# Patient Record
Sex: FEMALE | Race: ASIAN | NOT HISPANIC OR LATINO | URBAN - METROPOLITAN AREA
[De-identification: names, ages, dates, MRNs, and addresses within clinical notes are randomized per-mention and may not be internally consistent; named-entity substitution may affect disease eponyms.]

---

## 2017-12-24 ENCOUNTER — EMERGENCY (EMERGENCY)
Facility: HOSPITAL | Age: 82
LOS: 1 days | Discharge: HOME | End: 2017-12-24
Attending: PERSONAL EMERGENCY RESPONSE ATTENDANT | Admitting: PERSONAL EMERGENCY RESPONSE ATTENDANT
Payer: MEDICARE

## 2017-12-24 VITALS
HEART RATE: 60 BPM | RESPIRATION RATE: 18 BRPM | OXYGEN SATURATION: 100 % | DIASTOLIC BLOOD PRESSURE: 60 MMHG | SYSTOLIC BLOOD PRESSURE: 144 MMHG

## 2017-12-24 VITALS
HEIGHT: 59 IN | DIASTOLIC BLOOD PRESSURE: 71 MMHG | WEIGHT: 89.95 LBS | RESPIRATION RATE: 18 BRPM | TEMPERATURE: 99 F | OXYGEN SATURATION: 98 % | SYSTOLIC BLOOD PRESSURE: 136 MMHG | HEART RATE: 68 BPM

## 2017-12-24 DIAGNOSIS — Z96.649 PRESENCE OF UNSPECIFIED ARTIFICIAL HIP JOINT: Chronic | ICD-10-CM

## 2017-12-24 PROCEDURE — 73090 X-RAY EXAM OF FOREARM: CPT

## 2017-12-24 PROCEDURE — 70450 CT HEAD/BRAIN W/O DYE: CPT

## 2017-12-24 PROCEDURE — 25605 CLTX DST RDL FX/EPHYS SEP W/: CPT | Mod: LT

## 2017-12-24 PROCEDURE — 72125 CT NECK SPINE W/O DYE: CPT | Mod: 26

## 2017-12-24 PROCEDURE — 72125 CT NECK SPINE W/O DYE: CPT

## 2017-12-24 PROCEDURE — 99285 EMERGENCY DEPT VISIT HI MDM: CPT | Mod: 25

## 2017-12-24 PROCEDURE — 73100 X-RAY EXAM OF WRIST: CPT | Mod: 26,59,LT

## 2017-12-24 PROCEDURE — 73110 X-RAY EXAM OF WRIST: CPT | Mod: 26,LT

## 2017-12-24 PROCEDURE — 99284 EMERGENCY DEPT VISIT MOD MDM: CPT

## 2017-12-24 PROCEDURE — 73100 X-RAY EXAM OF WRIST: CPT

## 2017-12-24 PROCEDURE — 71010: CPT | Mod: 26

## 2017-12-24 PROCEDURE — 70450 CT HEAD/BRAIN W/O DYE: CPT | Mod: 26

## 2017-12-24 PROCEDURE — 71045 X-RAY EXAM CHEST 1 VIEW: CPT

## 2017-12-24 PROCEDURE — 73090 X-RAY EXAM OF FOREARM: CPT | Mod: 26,LT

## 2017-12-24 PROCEDURE — 73110 X-RAY EXAM OF WRIST: CPT

## 2017-12-24 RX ORDER — ACETAMINOPHEN 500 MG
975 TABLET ORAL ONCE
Qty: 0 | Refills: 0 | Status: COMPLETED | OUTPATIENT
Start: 2017-12-24 | End: 2017-12-24

## 2017-12-24 RX ORDER — ACETAMINOPHEN 500 MG
1000 TABLET ORAL ONCE
Qty: 0 | Refills: 0 | Status: DISCONTINUED | OUTPATIENT
Start: 2017-12-24 | End: 2017-12-24

## 2017-12-24 RX ORDER — DIAZEPAM 5 MG
2.5 TABLET ORAL ONCE
Qty: 0 | Refills: 0 | Status: DISCONTINUED | OUTPATIENT
Start: 2017-12-24 | End: 2017-12-24

## 2017-12-24 RX ADMIN — Medication 975 MILLIGRAM(S): at 17:47

## 2017-12-24 RX ADMIN — Medication 2.5 MILLIGRAM(S): at 17:47

## 2017-12-24 NOTE — ED ADULT NURSE NOTE - OBJECTIVE STATEMENT
pt is an 89 yr F sent in by urgent care for L wrist fx in splint that was placed today at 2pm/ pt fell x 2 yesterday, once fell onto R hip and second time on to out stretched L hand. denies hitting head or LOC, as per family fall was unwitnessed. pt reports remembering fall. no midline cervical pain. pt able to move all extremities. cap refill < 3 secs on L hand, able to move fingers. no swelling or cyanosis noted.

## 2017-12-24 NOTE — ED PROVIDER NOTE - ATTENDING CONTRIBUTION TO CARE
Attending MD Navarro.  Agree with above.  Pt is an 89 yr old female with hx htn, hld, frequent falls 2/2 walking without her walker.  Pt had 2 falls yesterday, first of which was with her daughter when she refused assistance getting out of the car.  Pt fell onto R side.  Then walked into house and fell onto outstretched L wrist.  PT told she has a very sig ulnar fx and needs to come to ED by urgent care. Pt has equal pulses in all extremities, full distal movement of digits.  No LOC/head injury known.  2nd fall was unwitnessed however and CT head/neck ordered.  Pt has been ambulatory since both falls.  Pt seen at urgent care today prior to presentation.

## 2017-12-24 NOTE — CONSULT NOTE ADULT - SUBJECTIVE AND OBJECTIVE BOX
89y Female presents c/o L wrist pain sp mechanical fall. Denies Headstrike/LOC. Denies numbness, tingling paresthesias in affected extremity. Able to ambulate after fall. No other orthopedic injuries at this time    PAST MEDICAL & SURGICAL HISTORY:  Dementia  Hypertension  S/P total hip arthroplasty    MEDICATIONS  (STANDING):    Allergies    eggs (Other)  No Known Drug Allergies    Intolerances    Imaging: XR was personally reviewed and demonstates L distal radius fracture with loss of radial height and dorsal tilt, no intraarticular extension    Vital Signs Last 24 Hrs  T(C): 37.4 (12-24-17 @ 14:23), Max: 37.4 (12-24-17 @ 14:23)  T(F): 99.3 (12-24-17 @ 14:23), Max: 99.3 (12-24-17 @ 14:23)  HR: 60 (12-24-17 @ 18:41) (60 - 68)  BP: 144/60 (12-24-17 @ 18:41) (136/71 - 147/56)  BP(mean): --  RR: 18 (12-24-17 @ 18:41) (18 - 18)  SpO2: 100% (12-24-17 @ 18:41) (98% - 100%)    Gen: NAD  L UE: Skin intact, +gross deformity of the wrist, +ain/pin/m/r/u function, SILT C5-T1, radial pulse intact, compartments soft, brisk cap refill     Secondary Survey: Full ROM of unaffected extremities, SILT globally, compartments soft, no bony TTP over bony prominences, no calf TTP, able to SLR with bilateral LE, no TTP along axial spine    Procedure: 10cc 1% lidocaine hematoma block injected under sterile procedure into L wrist. Closed reduction and placement of a sugartong splint performed. Post procedure imaging demonstrates appropriately reduced distal radius. Post procedure exam demonstrated NV intact.    A/P: 89y Female w L distal radius fracture sp closed reduction and placement of sugartong splint  Pain control  NWB L UE in sling for comfort   keep splint clean and elisa  Ice, elevate extremity  Active movement of fingers encouraged  Signs and symptoms of compartment syndrome were discussed with the patient and advised to return to ED if suspected  Follow up with Dr. Treviño within 1 week, call office for appointment  Ortho stable for DC

## 2017-12-24 NOTE — ED ADULT NURSE NOTE - CAS DISCH ACCOMP BY
Family/Self/pt able to get into wheelchair from home. family reports 3 small steps to home and will be able to take care of pt

## 2017-12-24 NOTE — ED ADULT NURSE NOTE - CHPI ED SYMPTOMS NEG
no tingling/no abrasion/no weakness/no back pain/no fever/no numbness/no difficulty bearing weight/no bruising

## 2017-12-24 NOTE — ED ADULT NURSE REASSESSMENT NOTE - NS ED NURSE REASSESS COMMENT FT1
unable to gain IV access x 2, MD notified. as per MD will hold off on blood and IV until further notice

## 2017-12-24 NOTE — ED PROVIDER NOTE - CARE PLAN
Principal Discharge DX:	Distal radius fracture, left  Instructions for follow-up, activity and diet:	Follow up with your Primary Care Physician within the next 2-3 days  Bring a copy of your test results with you to your appointment  Continue your current medication regimen  Return to the Emergency Room if you experience new or worsening symptoms  keep splint in place  Take Motrin 400-600mg every 6 hrs as needed for pain. Take with food   Take Tylenol 650mg every 6 hrs as needed for pain.   Follow up with orthopedics Dr Treviño [phone]  Rest. Apply cold pack for 20 minutes multiple times daily. Elevate. Principal Discharge DX:	Distal radius fracture, left  Goal:	Distal radius/ulna fx L  Instructions for follow-up, activity and diet:	Follow up with your Primary Care Physician within the next 2-3 days  Bring a copy of your test results with you to your appointment  Continue your current medication regimen  Return to the Emergency Room if you experience new or worsening symptoms  keep splint in place  Take Motrin 400-600mg every 6 hrs as needed for pain. Take with food   Take Tylenol 650mg every 6 hrs as needed for pain.   Follow up with orthopedics Dr Treviño [phone]  Rest. Apply cold pack for 20 minutes multiple times daily. Elevate.

## 2017-12-24 NOTE — ED PROVIDER NOTE - PROGRESS NOTE DETAILS
[employer]:  Pt has distal radius/ulna fx with ulnar dislocation. Orthopedics consulted and reports that they will see pt. [employer]:  Pt has distal radius/ulna fx with ulnar dislocation. Orthopedics consulted and reports that they will see pt.  Pt stable at time of signout to incoming team pending ortho reduction/splinting.  Remains neurovascularly intact distal to site of ulna/radius fx.

## 2017-12-24 NOTE — ED PROVIDER NOTE - OBJECTIVE STATEMENT
88 yo F w/ PMH of dementia, HTN, frequent falls, recent admission in Jan 2016 for hip hemiarthroplasty s/p fall, presents with 2 falls yesterday. first getting out of car was pushing daughter out of the way because she didn't want help walking. fell onto the right side of her body, got herself up without difficulty. when she got into the house she fell again because she was walking without her walker. this time FOOSH to left. unwitnessed and required help walking. ambulatory since. patient sent to  today and was sent here for forearm fx with "possible nerve damage." patient unsure if she hit her head. per family she is at her baseline Mental status

## 2018-01-05 ENCOUNTER — APPOINTMENT (OUTPATIENT)
Dept: ORTHOPEDIC SURGERY | Facility: CLINIC | Age: 83
End: 2018-01-05
Payer: MEDICARE

## 2018-01-05 VITALS — HEIGHT: 59 IN | BODY MASS INDEX: 18.14 KG/M2 | WEIGHT: 90 LBS

## 2018-01-05 PROCEDURE — 25650 CLTX ULNAR STYLOID FRACTURE: CPT | Mod: LT

## 2018-01-05 PROCEDURE — 99214 OFFICE O/P EST MOD 30 MIN: CPT | Mod: 57

## 2018-01-05 PROCEDURE — 73110 X-RAY EXAM OF WRIST: CPT | Mod: LT

## 2018-01-05 PROCEDURE — 25500 CLTX RDL SHFT FX W/O MNPJ: CPT | Mod: LT

## 2018-01-05 RX ORDER — HYDROXYZINE HYDROCHLORIDE 25 MG/1
25 TABLET ORAL
Qty: 90 | Refills: 0 | Status: ACTIVE | COMMUNITY
Start: 2016-08-19

## 2018-01-05 RX ORDER — AMLODIPINE BESYLATE 2.5 MG/1
2.5 TABLET ORAL
Qty: 90 | Refills: 0 | Status: ACTIVE | COMMUNITY
Start: 2017-10-23

## 2018-02-19 NOTE — ED PROVIDER NOTE - PLAN OF CARE
Follow up with your Primary Care Physician within the next 2-3 days  Bring a copy of your test results with you to your appointment  Continue your current medication regimen  Return to the Emergency Room if you experience new or worsening symptoms  keep splint in place  Take Motrin 400-600mg every 6 hrs as needed for pain. Take with food   Take Tylenol 650mg every 6 hrs as needed for pain.   Follow up with orthopedics Dr Treviño [phone]  Rest. Apply cold pack for 20 minutes multiple times daily. Elevate. 0 Distal radius/ulna fx L

## 2018-02-21 ENCOUNTER — APPOINTMENT (OUTPATIENT)
Dept: ORTHOPEDIC SURGERY | Facility: CLINIC | Age: 83
End: 2018-02-21
Payer: MEDICARE

## 2018-02-21 DIAGNOSIS — S52.552D OTHER EXTRAARTICULAR FRACTURE OF LOWER END OF LEFT RADIUS, SUBSEQUENT ENCOUNTER FOR CLOSED FRACTURE WITH ROUTINE HEALING: ICD-10-CM

## 2018-02-21 DIAGNOSIS — S52.615A NONDISPLACED FRACTURE OF LEFT ULNA STYLOID PROCESS, INITIAL ENCOUNTER FOR CLOSED FRACTURE: ICD-10-CM

## 2018-02-21 DIAGNOSIS — S52.615D NONDISPLACED FRACTURE OF LEFT ULNA STYLOID PROCESS, SUBSEQUENT ENCOUNTER FOR CLOSED FRACTURE WITH ROUTINE HEALING: ICD-10-CM

## 2018-02-21 DIAGNOSIS — S52.572A OTHER INTRAARTICULAR FRACTURE OF LOWER END OF LEFT RADIUS, INITIAL ENCOUNTER FOR CLOSED FRACTURE: ICD-10-CM

## 2018-02-21 PROCEDURE — 99024 POSTOP FOLLOW-UP VISIT: CPT

## 2018-02-21 PROCEDURE — 73110 X-RAY EXAM OF WRIST: CPT | Mod: LT

## 2018-07-24 ENCOUNTER — OUTPATIENT (OUTPATIENT)
Dept: OUTPATIENT SERVICES | Facility: HOSPITAL | Age: 83
LOS: 1 days | Discharge: HOME | End: 2018-07-24

## 2018-07-24 DIAGNOSIS — Z96.649 PRESENCE OF UNSPECIFIED ARTIFICIAL HIP JOINT: Chronic | ICD-10-CM

## 2018-07-24 DIAGNOSIS — D69.3 IMMUNE THROMBOCYTOPENIC PURPURA: ICD-10-CM

## 2018-08-01 ENCOUNTER — APPOINTMENT (OUTPATIENT)
Dept: HEMATOLOGY ONCOLOGY | Facility: CLINIC | Age: 83
End: 2018-08-01

## 2018-08-26 ENCOUNTER — EMERGENCY (EMERGENCY)
Facility: HOSPITAL | Age: 83
LOS: 1 days | Discharge: HOME | End: 2018-08-26
Attending: EMERGENCY MEDICINE
Payer: MEDICARE

## 2018-08-26 VITALS
RESPIRATION RATE: 18 BRPM | TEMPERATURE: 99 F | OXYGEN SATURATION: 95 % | HEIGHT: 60 IN | SYSTOLIC BLOOD PRESSURE: 152 MMHG | WEIGHT: 89.95 LBS | HEART RATE: 75 BPM | DIASTOLIC BLOOD PRESSURE: 76 MMHG

## 2018-08-26 VITALS
SYSTOLIC BLOOD PRESSURE: 160 MMHG | HEART RATE: 72 BPM | RESPIRATION RATE: 20 BRPM | TEMPERATURE: 98 F | DIASTOLIC BLOOD PRESSURE: 77 MMHG | OXYGEN SATURATION: 99 %

## 2018-08-26 DIAGNOSIS — Z96.649 PRESENCE OF UNSPECIFIED ARTIFICIAL HIP JOINT: Chronic | ICD-10-CM

## 2018-08-26 PROCEDURE — 99283 EMERGENCY DEPT VISIT LOW MDM: CPT

## 2018-08-26 PROCEDURE — 73630 X-RAY EXAM OF FOOT: CPT

## 2018-08-26 PROCEDURE — 73630 X-RAY EXAM OF FOOT: CPT | Mod: 26,LT

## 2018-08-26 RX ORDER — ACETAMINOPHEN 500 MG
975 TABLET ORAL ONCE
Qty: 0 | Refills: 0 | Status: COMPLETED | OUTPATIENT
Start: 2018-08-26 | End: 2018-08-26

## 2018-08-26 RX ADMIN — Medication 975 MILLIGRAM(S): at 19:22

## 2018-08-26 NOTE — ED PROVIDER NOTE - MUSCULOSKELETAL, MLM
left knee and ankle non tender full ROM.  Left foot with some tenderness over the ventral aspect, toes full ROM

## 2018-08-26 NOTE — ED ADULT NURSE NOTE - OBJECTIVE STATEMENT
86 yo F PMH of dementia, HTN, frequent falls presenting to ED from home accompanied by her daughter c/o left foot pain x the passed few dsyas and now worse today. daughter states that pt did not fall or injure herself recently that she was made aware of, states that pt does have hx of frequent falls but did not start c/o the left foot pain and difficulty walking and pain upon ambulation until today. left foot ecchymosis noted upon assessment with tenderness upon palpation, no swelling, pulses present with equal warmth and sensation. VS stable. pending xray.

## 2018-08-26 NOTE — ED PROVIDER NOTE - MEDICAL DECISION MAKING DETAILS
ATTG: : foot pain with recent fall, concern for fracture. check xray, pain medication, re eval for dispo.

## 2018-08-26 NOTE — ED PROVIDER NOTE - OBJECTIVE STATEMENT
88 yo F w/ PMH of dementia, HTN, frequent falls coming in with left foot pain that was worse today.  Pt's daughter believes she injured it during another fall earlier this week but initially was not that painful and she was not complaints.  Today had another fall into a chair (landed on her bottom) that did not cause any lower back or buttock pain however since has been complaining of worsening left foot pain and now is having difficulty putting weight on it.  No other complaints at this time.  no neck or back pain.  Did not hit head no LOC.  Nothing makes it better, ambulation makes it worse.

## 2018-08-26 NOTE — ED PROVIDER NOTE - ATTENDING CONTRIBUTION TO CARE
86 yo F w/ PMH of dementia, HTN, frequent falls presents with daughter for concern of pain on left foot. Patient and her daughter note she fell on Thursday, without complaints at that time. Today started having pain increasing and unable to walk or bear weight secondary to pain. also fell onto her buttocks getting into a chair. denies repeat trauma to her foot. no weakness no numbness. no burning pain.  No other complaints at this time.  no neck or back pain.  Did not hit head no LOC. did not take any medications for the pain.   Gen.  elderly woman, appears stated age.   HEENT:  nc/at  Lungs:  b/l bs  back: no ecchymosis no deformity. no midline tender. + scoliotic spine.   CVS: S1S2   Abd;  soft non tender  Ext: left foot ecchymosis and edema non pitting on left foot. full range of motion of foot / ankle and toes. min tend over 3rd toe. skin warm to touch. pulse 2+ b/l. no erythema.   Neuro: aaox 3 no focal deficits  MSK: moving all ext, able to sit up in chair and ambulate with some pain in foot.

## 2018-08-26 NOTE — ED ADULT NURSE NOTE - NSIMPLEMENTINTERV_GEN_ALL_ED
Implemented All Fall with Harm Risk Interventions:  [place] to call system. Call bell, personal items and telephone within reach. Instruct patient to call for assistance. Room bathroom lighting operational. Non-slip footwear when patient is off stretcher. Physically safe environment: no spills, clutter or unnecessary equipment. Stretcher in lowest position, wheels locked, appropriate side rails in place. Provide visual cue, wrist band, yellow gown, etc. Monitor gait and stability. Monitor for mental status changes and reorient to person, place, and time. Review medications for side effects contributing to fall risk. Reinforce activity limits and safety measures with patient and family. Provide visual clues: red socks.

## 2018-08-27 NOTE — ED POST DISCHARGE NOTE - RESULT SUMMARY
xray foot result showing: is linear lucencies at the base of the second digit, age indeterminate fracture.

## 2018-08-27 NOTE — ED POST DISCHARGE NOTE - DETAILS
unable to leave message. patient placed in hard-soled shoe for d/c. attempt to call patient to advise on this result and ensure follow-up. -Merlyn Minor PA-C advised the patients emergency contact samira of the discrepency and need to stay in boot until follow up with podiatry. demonstrates understanding - Charmaine Macias PA-C

## 2020-01-01 ENCOUNTER — EMERGENCY (EMERGENCY)
Facility: HOSPITAL | Age: 85
LOS: 1 days | End: 2020-01-01
Attending: EMERGENCY MEDICINE | Admitting: EMERGENCY MEDICINE
Payer: MEDICARE

## 2020-01-01 VITALS — RESPIRATION RATE: 16 BRPM | HEART RATE: 59 BPM | OXYGEN SATURATION: 62 %

## 2020-01-01 VITALS
TEMPERATURE: 98 F | RESPIRATION RATE: 16 BRPM | DIASTOLIC BLOOD PRESSURE: 97 MMHG | HEART RATE: 104 BPM | OXYGEN SATURATION: 97 % | SYSTOLIC BLOOD PRESSURE: 189 MMHG

## 2020-01-01 DIAGNOSIS — Z96.649 PRESENCE OF UNSPECIFIED ARTIFICIAL HIP JOINT: Chronic | ICD-10-CM

## 2020-01-01 LAB
ALBUMIN SERPL ELPH-MCNC: 4.1 G/DL — SIGNIFICANT CHANGE UP (ref 3.3–5)
ALP SERPL-CCNC: 121 U/L — HIGH (ref 40–120)
ALT FLD-CCNC: 11 U/L — SIGNIFICANT CHANGE UP (ref 4–33)
ANION GAP SERPL CALC-SCNC: 17 MMO/L — HIGH (ref 7–14)
AST SERPL-CCNC: 40 U/L — HIGH (ref 4–32)
BILIRUB SERPL-MCNC: 0.6 MG/DL — SIGNIFICANT CHANGE UP (ref 0.2–1.2)
BUN SERPL-MCNC: 22 MG/DL — SIGNIFICANT CHANGE UP (ref 7–23)
CALCIUM SERPL-MCNC: 9.4 MG/DL — SIGNIFICANT CHANGE UP (ref 8.4–10.5)
CHLORIDE SERPL-SCNC: 100 MMOL/L — SIGNIFICANT CHANGE UP (ref 98–107)
CO2 SERPL-SCNC: 16 MMOL/L — LOW (ref 22–31)
CREAT SERPL-MCNC: 0.73 MG/DL — SIGNIFICANT CHANGE UP (ref 0.5–1.3)
GLUCOSE SERPL-MCNC: 303 MG/DL — HIGH (ref 70–99)
HCT VFR BLD CALC: 38.3 % — SIGNIFICANT CHANGE UP (ref 34.5–45)
HGB BLD-MCNC: 12.4 G/DL — SIGNIFICANT CHANGE UP (ref 11.5–15.5)
INR BLD: 1.05 — SIGNIFICANT CHANGE UP (ref 0.88–1.17)
MAGNESIUM SERPL-MCNC: 2.1 MG/DL — SIGNIFICANT CHANGE UP (ref 1.6–2.6)
MCHC RBC-ENTMCNC: 31 PG — SIGNIFICANT CHANGE UP (ref 27–34)
MCHC RBC-ENTMCNC: 32.4 % — SIGNIFICANT CHANGE UP (ref 32–36)
MCV RBC AUTO: 95.8 FL — SIGNIFICANT CHANGE UP (ref 80–100)
NRBC # FLD: 0 K/UL — SIGNIFICANT CHANGE UP (ref 0–0)
PHOSPHATE SERPL-MCNC: 3.8 MG/DL — SIGNIFICANT CHANGE UP (ref 2.5–4.5)
PLATELET # BLD AUTO: 252 K/UL — SIGNIFICANT CHANGE UP (ref 150–400)
PMV BLD: 9.4 FL — SIGNIFICANT CHANGE UP (ref 7–13)
POTASSIUM SERPL-MCNC: 4.2 MMOL/L — SIGNIFICANT CHANGE UP (ref 3.5–5.3)
POTASSIUM SERPL-SCNC: 4.2 MMOL/L — SIGNIFICANT CHANGE UP (ref 3.5–5.3)
PROT SERPL-MCNC: 7.4 G/DL — SIGNIFICANT CHANGE UP (ref 6–8.3)
PROTHROM AB SERPL-ACNC: 11.7 SEC — SIGNIFICANT CHANGE UP (ref 9.8–13.1)
RBC # BLD: 4 M/UL — SIGNIFICANT CHANGE UP (ref 3.8–5.2)
RBC # FLD: 11.7 % — SIGNIFICANT CHANGE UP (ref 10.3–14.5)
SODIUM SERPL-SCNC: 133 MMOL/L — LOW (ref 135–145)
TROPONIN T, HIGH SENSITIVITY: 12 NG/L — SIGNIFICANT CHANGE UP (ref ?–14)
WBC # BLD: 17.2 K/UL — HIGH (ref 3.8–10.5)
WBC # FLD AUTO: 17.2 K/UL — HIGH (ref 3.8–10.5)

## 2020-01-01 PROCEDURE — 99285 EMERGENCY DEPT VISIT HI MDM: CPT

## 2020-01-01 PROCEDURE — 71045 X-RAY EXAM CHEST 1 VIEW: CPT | Mod: 26

## 2020-01-01 PROCEDURE — 70450 CT HEAD/BRAIN W/O DYE: CPT | Mod: 26

## 2020-01-01 PROCEDURE — 99292 CRITICAL CARE ADDL 30 MIN: CPT | Mod: 25

## 2020-01-01 PROCEDURE — 99291 CRITICAL CARE FIRST HOUR: CPT | Mod: 25

## 2020-01-01 PROCEDURE — 31500 INSERT EMERGENCY AIRWAY: CPT

## 2020-01-01 RX ORDER — CHLORHEXIDINE GLUCONATE 213 G/1000ML
15 SOLUTION TOPICAL EVERY 12 HOURS
Refills: 0 | Status: DISCONTINUED | OUTPATIENT
Start: 2020-01-01 | End: 2020-01-05

## 2020-01-01 RX ORDER — ROCURONIUM BROMIDE 10 MG/ML
60 VIAL (ML) INTRAVENOUS ONCE
Refills: 0 | Status: COMPLETED | OUTPATIENT
Start: 2020-01-01 | End: 2020-01-01

## 2020-01-01 RX ORDER — ETOMIDATE 2 MG/ML
20 INJECTION INTRAVENOUS ONCE
Refills: 0 | Status: COMPLETED | OUTPATIENT
Start: 2020-01-01 | End: 2020-01-01

## 2020-01-01 RX ORDER — MORPHINE SULFATE 50 MG/1
4 CAPSULE, EXTENDED RELEASE ORAL ONCE
Refills: 0 | Status: DISCONTINUED | OUTPATIENT
Start: 2020-01-01 | End: 2020-01-01

## 2020-01-01 RX ADMIN — Medication 60 MILLIGRAM(S): at 20:22

## 2020-01-01 RX ADMIN — ETOMIDATE 20 MILLIGRAM(S): 2 INJECTION INTRAVENOUS at 20:21

## 2020-01-01 RX ADMIN — MORPHINE SULFATE 4 MILLIGRAM(S): 50 CAPSULE, EXTENDED RELEASE ORAL at 22:22

## 2020-01-01 RX ADMIN — MORPHINE SULFATE 4 MILLIGRAM(S): 50 CAPSULE, EXTENDED RELEASE ORAL at 22:35

## 2020-01-01 NOTE — ED PROVIDER NOTE - CRITICAL CARE PROVIDED
telephone consultation with the patient's family/conducted a detailed discussion of DNR status/interpretation of diagnostic studies/consultation with other physicians

## 2020-01-01 NOTE — CONSULT NOTE ADULT - ASSESSMENT
89yo w SAH, SDH, IPH with herniation, made DNR by family     PLAN:   - no neurosurgical intervention

## 2020-01-01 NOTE — ED ADULT TRIAGE NOTE - CHIEF COMPLAINT QUOTE
Pt. had unwitnessed slumped fall to left side around 6:10 after eating meal. Pt. normally speaks but not responding at this time Pt. moaning and gurgling in triage-suctioned on the way over. No facial droop noted. Pmhx: Dementia, alzheimers, HTN. MD called for eval -no stroke code called

## 2020-01-01 NOTE — ED PROVIDER NOTE - CLINICAL SUMMARY MEDICAL DECISION MAKING FREE TEXT BOX
89F with hx of HTN, ITP (not on prednisone, last platelet wnl last year) presenting with acute onset obtundation since 6 pm today. Concerning for ICH bleed. Plan -  basics, CTH.

## 2020-01-01 NOTE — PROVIDER CONTACT NOTE (OTHER) - ASSESSMENT
Per request of provider, SW met with patient's daughter Tierney in family room. Tierney reports she is reaching out to family and she is not in need of assistance at this time. SW to remain available if needed.

## 2020-01-01 NOTE — ED PROVIDER NOTE - PHYSICAL EXAMINATION
GENERAL: no acute distress, non-toxic appearing  HEAD: normocephalic, atraumatic  HEENT: normal conjunctiva, oral mucosa moist, neck supple  CARDIAC: regular rate and rhythm, normal S1 and S2,  no appreciable murmurs  PULM: coarse lung sounds bilaterally  GI: abdomen nondistended, soft, nontender, no guarding or rebound tenderness  NEURO: pupils 4 mm bilaterally, non-reactive to light; decorticate posturing; GCS < 6  MSK: no visible deformities, no peripheral edema, calf tenderness/redness/swelling  SKIN: no visible rashes, dry, well-perfused

## 2020-01-01 NOTE — ED ADULT NURSE NOTE - OBJECTIVE STATEMENT
Pt received to [facility] unresponsive with shallow breathing and posturing. Pt has dried dark green vomit noted around the mouth. MD at bedside for intubation

## 2020-01-01 NOTE — ED PROCEDURE NOTE - ATTENDING CONTRIBUTION TO CARE
Gonflor: I have seen and examined the patient face to face.  I was present during the above procedure and  have reviewed and addended the procedure note.

## 2020-01-01 NOTE — ED PROVIDER NOTE - PROGRESS NOTE DETAILS
Destin: patient's son, a physician, called daughter who is at bedside stating he wants patient to be made DNR. Patient s/p intubation for AMS. CT showing catastrophic bleed. NSGY called, but likely no intervention will be offered. Destin: LILY consulted. Resident: Ivan West - Called by triage RN for code stroke eval. Upon arrival to pts bedside, minimally responsive, no reaction to pain, non-verbal with vomitus on night gown. Advised that pt needs immediate eval in room likely needs intubation prior to CT scan, RN advised pt will be moved to room urgently, spoke with red team regarding pt. Gong: Patient's family noted to want to terminally extubate.  Patient has extensive bleed.  Offered support services, family's  at bedside.  Answered all questions and addressed all concerns. Will give morphine for any potential comfort. Tong: patient pronounced at 12:01 am 12/20. Family updated, all questions and concerns addressed. Destin: spoke with Reny, Investigator for ME office regarding patient's case. Rejected as ME case. Case number [id number].

## 2020-01-01 NOTE — ED PROVIDER NOTE - CRITICAL CARE INDICATION, MLM
patient was critically ill... Patient was critically ill with a high probability of imminent or life threatening deterioration.    89 yo F HTN ITP a/w ams concern for acute ICH.  Concern for thrombocytopenia.  Intubated for airway protection and stat ct ordered.  Will require neurosurg and ICU consult.  Extensive goals of care discussion occurred with daughter at bedside.  Discussed findings of exam and ct scan with son-in law ED physician who is in [place]. Patient was critically ill with a high probability of imminent or life threatening deterioration.

## 2020-01-01 NOTE — ED PROVIDER NOTE - OBJECTIVE STATEMENT
89F with hx of HTN, 89F with hx of HTN, ITP (not on prednisone, last platelet wnl last year) presenting with acute onset obtundation since 6 pm today. Yesterday, was at baseline state of health, eating, talking, walking normally. Today, daughter helped her shower around 6 pm. Afterwards, patient was found to be obtunded. En route to hospital, patient vomited twice. In ER, patient presenting with decorticate posturing, obtundation.

## 2020-01-01 NOTE — ED ADULT NURSE NOTE - NSIMPLEMENTINTERV_GEN_ALL_ED
Implemented All Fall with Harm Risk Interventions:  [place] to call system. Call bell, personal items and telephone within reach. Instruct patient to call for assistance. Room bathroom lighting operational. Non-slip footwear when patient is off stretcher. Physically safe environment: no spills, clutter or unnecessary equipment. Stretcher in lowest position, wheels locked, appropriate side rails in place. Provide visual cue, wrist band, yellow gown, etc. Monitor gait and stability. Monitor for mental status changes and reorient to person, place, and time. Review medications for side effects contributing to fall risk. Reinforce activity limits and safety measures with patient and family. Provide visual clues: red socks.

## 2020-01-01 NOTE — ED ADULT NURSE NOTE - FINAL NURSING ELECTRONIC SIGNATURE
M-Plasty Intermediate Repair Preamble Text (Leave Blank If You Do Not Want): Undermining was performed with blunt dissection. 02-Jan-2020 01:49

## 2020-01-01 NOTE — CONSULT NOTE ADULT - SUBJECTIVE AND OBJECTIVE BOX
NEUROSURGERY CONSULT  CORAL LORENZO   01-01-20 @ 21:48    HPI: 89y Female PMHx ITP, HTN presents after being found slumped on toilet by daughter who she lives with. En route to hospital, patient vomited twice, was obtunded upon arrival per ED, intubated in ED. Pt found to have acute SAH, acute IPH, acute on chronic SDH with herniation.     RADIOLOGY:   < from: CT Head No Cont (01.01.20 @ 19:51) >  There is evidence of a large acute on chronic subdural hematoma seen involving the right temporal frontal and parietal region. This subdural hematoma measures approximately 2.1 cm in widest diameter. Small acute subdural hematoma is seen along the bilateral tentorial region.    Acute subarachnoid hemorrhage is seen involving the basal cistern region.    Large acute parenchymal hemorrhage is seen involving the right basal ganglia region with surrounding edema. This area of parenchymal hemorrhage measures approximately 5.8 x 3.9 cm.    There is mass effect seen on the right lateral ventricle with right to left (2.9 cm) shift. Subfalcine and uncal herniation is identified. Possible early transtentorial herniation is seen.    Abnormal low-attenuation is seen involving the bifrontal region which is suspicious for acute bilateral anterior cerebral artery infarcts. Acute right MCA infarct is also suspected.    Evaluation of the osseous structures with the appropriate window appears unremarkable.    Left maxillary sinus mucosal thickening is seen.    Patient is status post bilateral scleral banding.    Impression: Acute on chronic subdural hematoma with subarachnoid and parenchymal hemorrhage as described above.    Right to left shift as well as subfalcine, uncal and transtentorial herniations.    Acute infarct suspected as described above.    MEDS:  chlorhexidine 0.12% Liquid 15 milliLiter(s) Oral Mucosa every 12 hours    PHYSICAL EXAM:  Vital Signs Last 24 Hrs  T(C): 36.4 (01 Jan 2020 20:20), Max: 36.4 (01 Jan 2020 19:01)  T(F): 97.6 (01 Jan 2020 20:20), Max: 97.6 (01 Jan 2020 20:20)  HR: 107 (01 Jan 2020 21:30) (97 - 107)  BP: 156/90 (01 Jan 2020 21:30) (150/91 - 189/97)  BP(mean): 106 (01 Jan 2020 21:30) (106 - 106)  RR: 16 (01 Jan 2020 21:30) (16 - 16)  SpO2: 100% (01 Jan 2020 21:30) (97% - 100%)    intubated, not sedated   pupils 3mm not reactive   no gag   no corneals   no response to noxious     LABS:                        12.4   17.20 )-----------( 252      ( 01 Jan 2020 19:40 )             38.3     01-01    133<L>  |  100  |  22  ----------------------------<  303<H>  4.2   |  16<L>  |  0.73    Ca    9.4      01 Jan 2020 19:40  Phos  3.8     01-01  Mg     2.1     01-01    TPro  7.4  /  Alb  4.1  /  TBili  0.6  /  DBili  x   /  AST  40<H>  /  ALT  11  /  AlkPhos  121<H>  01-01  PT/INR - ( 01 Jan 2020 19:40 )   PT: 11.7 SEC;   INR: 1.05

## 2020-01-02 NOTE — ED ADULT NURSE REASSESSMENT NOTE - CONDITION
Pt noted to be in asystole on cardiac monitor with absences of pulse and spontaneous breathing. ED MD pronounced time of death at 0001. Family at bedside.

## 2020-01-02 NOTE — DISCHARGE NOTE FOR THE EXPIRED PATIENT - HOSPITAL COURSE
89F with hx of HTN, ITP presenting with obtundation since 6 pm. Patient unresponsive in the ED. Had 2 episodes of emesis en route to the hospital. Patient accompanied by daughter, who stated patient was at her regular state of health up until today. No trauma or falls per the daughter. Patient had GCS < 6 upon presentation, pupils 4 mm bilaterally non-reactive with decorticate posturing and was subsequently intubated for airway protection. CTH showed acute on chronic subdural hematoma with subarachnoid and parenchymal hemorrhage with right to left shift and subfalcine, uncal and transtentorial herniations. Patient made DNR by family. Patient pronounced at 12:01 am 1/2/20 via cardiopulmonary criteria. No lung sounds upon ascultation, no pulse palpable, no heart sounds, asystole on the cardiac monitor. Family updated, all questions and concerns addressed.

## 2020-01-02 NOTE — ED ADULT NURSE REASSESSMENT NOTE - NS ED NURSE REASSESS COMMENT FT1
Daughter Tierney Ramirez contacted and confirmed would like us to discard house gown & bathrobe that patient was wearing.  Confirmed with her to discard and not to  at a later time.
Patient received to Trauma C from day shift RN - intubated for catastrophic brain bleed found on CTH post intubation, pending family arrival and goals of care discussion.  Pt currently not on any medications/infusions, nonreactive, vitally stable, respirations even/nonlabored on Vent settings as ordered, HR Sinus tachycardia and BP stable.  Family @ bedside.  Will monitor.
Patient remains intubated, unresponsive, no medications infusing.  Remains in critical/guarded conditions vitals stable at this time.  Patient family assembled at bedside, plans for extubation from ventilator as family would like to make DNR/DNI.  ICU-ED Float CHAVA Montano at bedside to assist with preparation and monitoring with ED MD Hackett.  Will monitor.
Pt terminally weaned to 2L nasal cannula. Morpine given as ordered prior to extubation. Pt currently DNR/DNI. Family at bedside
Patient noted to be asystole on cardiac monitor, no respiratory activity noted. MD Hackett aware and at bedside to assess patient, Time of Death to be noted at 00:01 following terminal extubation. Family at bedside.  Hand-off given to break coverage CHAVA Thompson.

## 2021-04-28 NOTE — ED PROVIDER NOTE - PROGRESS NOTE DETAILS
Patient called me back and confirmed her appt with Dr Ram on 5/14/2021    cheryl cano CMA   ATTG: : xray with no acute fracture, placed in post op shoe. pain better. dc home follow with pmd and podiatry

## 2022-09-27 NOTE — ED PROVIDER NOTE - ATTENDING CONTRIBUTION TO CARE
Roxann: I have seen and examined the patient face to face, have reviewed and addended the HPI, PE and a/p as necessary.    89 yo F HTN ITP (on no meds last platelet wnl), a/w altered mental status starting at 1810 tonight.  Pt was noted to be altered earlier.  In triage, patient noted to have vomit on self, and with decorticate posteruring.  Pt emergently brought to trauma c.      Gen - altered, non-responsive, with posturing  Eyes - fixed 5 mm bilaterally  Mouth - no gag reflex  CARD -s1s2, RRR, no M,G,R;   PULM - CTA b/l, symmetric breath sounds;   ABD -  +BS, ND, NT, soft, no guarding, no rebound, no masses;   BACK - no CVA tenderness, Normal  spine;   EXT - symmetric pulses, 2+ dp, capillary refill < 2 seconds, no clubbing, no cyanosis, no edema  NEURO - decorticate posturing    89 yo F HTN ITP a/w ams concern for acute ICH.  Concern for thrombocytopenia.  Intubated for airway protection and stat ct ordered.  Will require neurosurg and ICU consult.  Extensive goals of care discussion occurred with daughter at bedside.  Discussed findings of exam and ct scan with son-in law ED physician who is in [place]. negative - no cough

## 2023-06-02 NOTE — ED ADULT NURSE NOTE - CHIEF COMPLAINT
The patient is a 89y Female complaining of Apixaban/Eliquis is used to treat and prevent blood clots. If you are not able to swallow the tablets whole, they may be crushed and mixed in water, apple juice, or applesauce and promptly taken within four hours. Never skip a dose of Apixaban/Eliquis. If you forget to take your Apixaban/Eliquis, take a dose as soon as you remember. If it is almost time for your next Apixaban/Eliquis dose, wait until then and take a regular dose. DO NOT take an extra pill to ‘catch up’.  NEVER TAKE A DOUBLE DOSE. Notify your doctor that you missed a dose. Take Apixaban/Eliquis at the same time each morning and evening. Apixaban/Eliquis may be taken with other medication or food.